# Patient Record
Sex: FEMALE | Race: WHITE | URBAN - METROPOLITAN AREA
[De-identification: names, ages, dates, MRNs, and addresses within clinical notes are randomized per-mention and may not be internally consistent; named-entity substitution may affect disease eponyms.]

---

## 2017-01-16 ENCOUNTER — TRANSFERRED RECORDS (OUTPATIENT)
Dept: HEALTH INFORMATION MANAGEMENT | Facility: CLINIC | Age: 67
End: 2017-01-16

## 2017-05-27 ENCOUNTER — HEALTH MAINTENANCE LETTER (OUTPATIENT)
Age: 67
End: 2017-05-27

## 2017-06-29 ENCOUNTER — TRANSFERRED RECORDS (OUTPATIENT)
Dept: HEALTH INFORMATION MANAGEMENT | Facility: CLINIC | Age: 67
End: 2017-06-29

## 2017-07-06 RX ORDER — CLINDAMYCIN PHOSPHATE 900 MG/50ML
900 INJECTION, SOLUTION INTRAVENOUS
Status: CANCELLED | OUTPATIENT
Start: 2017-07-06

## 2017-07-06 RX ORDER — CLINDAMYCIN PHOSPHATE 900 MG/50ML
900 INJECTION, SOLUTION INTRAVENOUS SEE ADMIN INSTRUCTIONS
Status: CANCELLED | OUTPATIENT
Start: 2017-07-06

## 2017-07-06 RX ORDER — ATOVAQUONE AND PROGUANIL HYDROCHLORIDE 250; 100 MG/1; MG/1
1 TABLET, FILM COATED ORAL DAILY
Status: ON HOLD | COMMUNITY
End: 2017-07-07

## 2017-07-07 ENCOUNTER — HOSPITAL ENCOUNTER (OUTPATIENT)
Facility: CLINIC | Age: 67
Discharge: HOME | End: 2017-07-07
Attending: ORTHOPAEDIC SURGERY | Admitting: ORTHOPAEDIC SURGERY
Payer: COMMERCIAL

## 2017-07-07 ENCOUNTER — ANESTHESIA EVENT (OUTPATIENT)
Dept: SURGERY | Facility: CLINIC | Age: 67
End: 2017-07-07
Payer: COMMERCIAL

## 2017-07-07 ENCOUNTER — SURGERY (OUTPATIENT)
Age: 67
End: 2017-07-07

## 2017-07-07 ENCOUNTER — ANESTHESIA (OUTPATIENT)
Dept: SURGERY | Facility: CLINIC | Age: 67
End: 2017-07-07
Payer: COMMERCIAL

## 2017-07-07 VITALS
BODY MASS INDEX: 24.01 KG/M2 | OXYGEN SATURATION: 96 % | SYSTOLIC BLOOD PRESSURE: 84 MMHG | WEIGHT: 144.1 LBS | HEIGHT: 65 IN | RESPIRATION RATE: 16 BRPM | TEMPERATURE: 97.3 F | DIASTOLIC BLOOD PRESSURE: 49 MMHG

## 2017-07-07 DIAGNOSIS — M05.732 RHEUMATOID ARTHRITIS INVOLVING LEFT WRIST WITH POSITIVE RHEUMATOID FACTOR (H): Primary | ICD-10-CM

## 2017-07-07 PROCEDURE — 36000056 ZZH SURGERY LEVEL 3 1ST 30 MIN: Performed by: ORTHOPAEDIC SURGERY

## 2017-07-07 PROCEDURE — 40000170 ZZH STATISTIC PRE-PROCEDURE ASSESSMENT II: Performed by: ORTHOPAEDIC SURGERY

## 2017-07-07 PROCEDURE — 36000058 ZZH SURGERY LEVEL 3 EA 15 ADDTL MIN: Performed by: ORTHOPAEDIC SURGERY

## 2017-07-07 PROCEDURE — 25000125 ZZHC RX 250: Performed by: ORTHOPAEDIC SURGERY

## 2017-07-07 PROCEDURE — 25000125 ZZHC RX 250: Performed by: PHYSICIAN ASSISTANT

## 2017-07-07 PROCEDURE — 37000008 ZZH ANESTHESIA TECHNICAL FEE, 1ST 30 MIN: Performed by: ORTHOPAEDIC SURGERY

## 2017-07-07 PROCEDURE — 25000132 ZZH RX MED GY IP 250 OP 250 PS 637: Performed by: ANESTHESIOLOGY

## 2017-07-07 PROCEDURE — 71000012 ZZH RECOVERY PHASE 1 LEVEL 1 FIRST HR: Performed by: ORTHOPAEDIC SURGERY

## 2017-07-07 PROCEDURE — 27210995 ZZH RX 272: Performed by: NURSE ANESTHETIST, CERTIFIED REGISTERED

## 2017-07-07 PROCEDURE — 25000125 ZZHC RX 250: Performed by: NURSE ANESTHETIST, CERTIFIED REGISTERED

## 2017-07-07 PROCEDURE — S0077 INJECTION, CLINDAMYCIN PHOSP: HCPCS | Performed by: PHYSICIAN ASSISTANT

## 2017-07-07 PROCEDURE — 25000128 H RX IP 250 OP 636: Performed by: NURSE ANESTHETIST, CERTIFIED REGISTERED

## 2017-07-07 PROCEDURE — 25000128 H RX IP 250 OP 636: Performed by: ANESTHESIOLOGY

## 2017-07-07 PROCEDURE — 71000027 ZZH RECOVERY PHASE 2 EACH 15 MINS: Performed by: ORTHOPAEDIC SURGERY

## 2017-07-07 PROCEDURE — 88304 TISSUE EXAM BY PATHOLOGIST: CPT | Mod: 26 | Performed by: ORTHOPAEDIC SURGERY

## 2017-07-07 PROCEDURE — 37000009 ZZH ANESTHESIA TECHNICAL FEE, EACH ADDTL 15 MIN: Performed by: ORTHOPAEDIC SURGERY

## 2017-07-07 PROCEDURE — 27210794 ZZH OR GENERAL SUPPLY STERILE: Performed by: ORTHOPAEDIC SURGERY

## 2017-07-07 PROCEDURE — 88304 TISSUE EXAM BY PATHOLOGIST: CPT | Performed by: ORTHOPAEDIC SURGERY

## 2017-07-07 RX ORDER — BUPIVACAINE HYDROCHLORIDE AND EPINEPHRINE 5; 5 MG/ML; UG/ML
INJECTION, SOLUTION PERINEURAL PRN
Status: DISCONTINUED | OUTPATIENT
Start: 2017-07-07 | End: 2017-07-07 | Stop reason: HOSPADM

## 2017-07-07 RX ORDER — SODIUM CHLORIDE, SODIUM LACTATE, POTASSIUM CHLORIDE, CALCIUM CHLORIDE 600; 310; 30; 20 MG/100ML; MG/100ML; MG/100ML; MG/100ML
INJECTION, SOLUTION INTRAVENOUS CONTINUOUS PRN
Status: DISCONTINUED | OUTPATIENT
Start: 2017-07-07 | End: 2017-07-07

## 2017-07-07 RX ORDER — EPHEDRINE SULFATE 50 MG/ML
INJECTION, SOLUTION INTRAMUSCULAR; INTRAVENOUS; SUBCUTANEOUS PRN
Status: DISCONTINUED | OUTPATIENT
Start: 2017-07-07 | End: 2017-07-07

## 2017-07-07 RX ORDER — PROPOFOL 10 MG/ML
INJECTION, EMULSION INTRAVENOUS CONTINUOUS PRN
Status: DISCONTINUED | OUTPATIENT
Start: 2017-07-07 | End: 2017-07-07

## 2017-07-07 RX ORDER — ONDANSETRON 2 MG/ML
4 INJECTION INTRAMUSCULAR; INTRAVENOUS EVERY 30 MIN PRN
Status: DISCONTINUED | OUTPATIENT
Start: 2017-07-07 | End: 2017-07-07 | Stop reason: HOSPADM

## 2017-07-07 RX ORDER — GINSENG 100 MG
CAPSULE ORAL PRN
Status: DISCONTINUED | OUTPATIENT
Start: 2017-07-07 | End: 2017-07-07 | Stop reason: HOSPADM

## 2017-07-07 RX ORDER — NALOXONE HYDROCHLORIDE 0.4 MG/ML
.1-.4 INJECTION, SOLUTION INTRAMUSCULAR; INTRAVENOUS; SUBCUTANEOUS
Status: DISCONTINUED | OUTPATIENT
Start: 2017-07-07 | End: 2017-07-07 | Stop reason: HOSPADM

## 2017-07-07 RX ORDER — ONDANSETRON 4 MG/1
4 TABLET, ORALLY DISINTEGRATING ORAL EVERY 30 MIN PRN
Status: DISCONTINUED | OUTPATIENT
Start: 2017-07-07 | End: 2017-07-07 | Stop reason: HOSPADM

## 2017-07-07 RX ORDER — CLINDAMYCIN PHOSPHATE 900 MG/50ML
900 INJECTION, SOLUTION INTRAVENOUS
Status: COMPLETED | OUTPATIENT
Start: 2017-07-07 | End: 2017-07-07

## 2017-07-07 RX ORDER — FENTANYL CITRATE 50 UG/ML
25-50 INJECTION, SOLUTION INTRAMUSCULAR; INTRAVENOUS EVERY 5 MIN PRN
Status: DISCONTINUED | OUTPATIENT
Start: 2017-07-07 | End: 2017-07-07 | Stop reason: HOSPADM

## 2017-07-07 RX ORDER — CLINDAMYCIN PHOSPHATE 900 MG/50ML
900 INJECTION, SOLUTION INTRAVENOUS SEE ADMIN INSTRUCTIONS
Status: DISCONTINUED | OUTPATIENT
Start: 2017-07-07 | End: 2017-07-07 | Stop reason: HOSPADM

## 2017-07-07 RX ORDER — OXYCODONE AND ACETAMINOPHEN 5; 325 MG/1; MG/1
1-2 TABLET ORAL EVERY 4 HOURS PRN
Qty: 46 TABLET | Refills: 0 | Status: SHIPPED | OUTPATIENT
Start: 2017-07-07

## 2017-07-07 RX ORDER — LIDOCAINE HYDROCHLORIDE 20 MG/ML
INJECTION, SOLUTION INFILTRATION; PERINEURAL PRN
Status: DISCONTINUED | OUTPATIENT
Start: 2017-07-07 | End: 2017-07-07

## 2017-07-07 RX ORDER — HYDROXYZINE HYDROCHLORIDE 25 MG/1
TABLET, FILM COATED ORAL
Qty: 24 TABLET | Refills: 1 | Status: SHIPPED | OUTPATIENT
Start: 2017-07-07

## 2017-07-07 RX ORDER — SACCHAROMYCES BOULARDII 250 MG
250 CAPSULE ORAL DAILY
COMMUNITY

## 2017-07-07 RX ORDER — SODIUM CHLORIDE, SODIUM LACTATE, POTASSIUM CHLORIDE, CALCIUM CHLORIDE 600; 310; 30; 20 MG/100ML; MG/100ML; MG/100ML; MG/100ML
INJECTION, SOLUTION INTRAVENOUS CONTINUOUS
Status: DISCONTINUED | OUTPATIENT
Start: 2017-07-07 | End: 2017-07-07 | Stop reason: HOSPADM

## 2017-07-07 RX ORDER — MEPERIDINE HYDROCHLORIDE 25 MG/ML
12.5 INJECTION INTRAMUSCULAR; INTRAVENOUS; SUBCUTANEOUS
Status: DISCONTINUED | OUTPATIENT
Start: 2017-07-07 | End: 2017-07-07 | Stop reason: HOSPADM

## 2017-07-07 RX ORDER — LIDOCAINE HYDROCHLORIDE 5 MG/ML
INJECTION, SOLUTION INFILTRATION; PERINEURAL PRN
Status: DISCONTINUED | OUTPATIENT
Start: 2017-07-07 | End: 2017-07-07

## 2017-07-07 RX ORDER — ONDANSETRON 2 MG/ML
INJECTION INTRAMUSCULAR; INTRAVENOUS PRN
Status: DISCONTINUED | OUTPATIENT
Start: 2017-07-07 | End: 2017-07-07

## 2017-07-07 RX ORDER — OXYCODONE AND ACETAMINOPHEN 5; 325 MG/1; MG/1
1 TABLET ORAL ONCE
Status: COMPLETED | OUTPATIENT
Start: 2017-07-07 | End: 2017-07-07

## 2017-07-07 RX ORDER — DEXAMETHASONE SODIUM PHOSPHATE 4 MG/ML
INJECTION, SOLUTION INTRA-ARTICULAR; INTRALESIONAL; INTRAMUSCULAR; INTRAVENOUS; SOFT TISSUE PRN
Status: DISCONTINUED | OUTPATIENT
Start: 2017-07-07 | End: 2017-07-07

## 2017-07-07 RX ORDER — FENTANYL CITRATE 50 UG/ML
INJECTION, SOLUTION INTRAMUSCULAR; INTRAVENOUS PRN
Status: DISCONTINUED | OUTPATIENT
Start: 2017-07-07 | End: 2017-07-07

## 2017-07-07 RX ORDER — FENTANYL CITRATE 50 UG/ML
25-50 INJECTION, SOLUTION INTRAMUSCULAR; INTRAVENOUS
Status: DISCONTINUED | OUTPATIENT
Start: 2017-07-07 | End: 2017-07-07 | Stop reason: HOSPADM

## 2017-07-07 RX ADMIN — SODIUM CHLORIDE, POTASSIUM CHLORIDE, SODIUM LACTATE AND CALCIUM CHLORIDE: 600; 310; 30; 20 INJECTION, SOLUTION INTRAVENOUS at 09:20

## 2017-07-07 RX ADMIN — LIDOCAINE HYDROCHLORIDE 50 ML: 5 INJECTION, SOLUTION INFILTRATION; PERINEURAL at 09:26

## 2017-07-07 RX ADMIN — ONDANSETRON 4 MG: 2 INJECTION INTRAMUSCULAR; INTRAVENOUS at 09:35

## 2017-07-07 RX ADMIN — BUPIVACAINE HYDROCHLORIDE AND EPINEPHRINE BITARTRATE 7 ML: 5; .005 INJECTION, SOLUTION PERINEURAL at 10:23

## 2017-07-07 RX ADMIN — FENTANYL CITRATE 50 MCG: 50 INJECTION, SOLUTION INTRAMUSCULAR; INTRAVENOUS at 09:21

## 2017-07-07 RX ADMIN — MIDAZOLAM HYDROCHLORIDE 2 MG: 1 INJECTION, SOLUTION INTRAMUSCULAR; INTRAVENOUS at 09:21

## 2017-07-07 RX ADMIN — Medication 5 MG: at 10:26

## 2017-07-07 RX ADMIN — OXYCODONE HYDROCHLORIDE AND ACETAMINOPHEN 1 TABLET: 5; 325 TABLET ORAL at 11:08

## 2017-07-07 RX ADMIN — DEXAMETHASONE SODIUM PHOSPHATE 4 MG: 4 INJECTION, SOLUTION INTRA-ARTICULAR; INTRALESIONAL; INTRAMUSCULAR; INTRAVENOUS; SOFT TISSUE at 09:24

## 2017-07-07 RX ADMIN — DEXMEDETOMIDINE HYDROCHLORIDE 8 MCG: 100 INJECTION, SOLUTION INTRAVENOUS at 09:24

## 2017-07-07 RX ADMIN — FENTANYL CITRATE 50 MCG: 50 INJECTION, SOLUTION INTRAMUSCULAR; INTRAVENOUS at 10:45

## 2017-07-07 RX ADMIN — LIDOCAINE HYDROCHLORIDE 40 MG: 20 INJECTION, SOLUTION INFILTRATION; PERINEURAL at 09:30

## 2017-07-07 RX ADMIN — PROPOFOL 30 MCG/KG/MIN: 10 INJECTION, EMULSION INTRAVENOUS at 09:31

## 2017-07-07 RX ADMIN — BACITRACIN 1 G: 500 OINTMENT TOPICAL at 10:26

## 2017-07-07 RX ADMIN — CLINDAMYCIN PHOSPHATE 900 MG: 18 INJECTION, SOLUTION INTRAVENOUS at 09:22

## 2017-07-07 RX ADMIN — Medication 5 MG: at 10:20

## 2017-07-07 NOTE — ANESTHESIA PREPROCEDURE EVALUATION
Anesthesia Evaluation     . Pt has had prior anesthetic. Type: General    No history of anesthetic complications          ROS/MED HX    ENT/Pulmonary:       Neurologic:       Cardiovascular:     (+) Dyslipidemia, ----. : . . . :. .       METS/Exercise Tolerance:     Hematologic:         Musculoskeletal:   (+) arthritis (Rheumatoid), , , -       GI/Hepatic:     (+) GERD Asymptomatic on medication,       Renal/Genitourinary:         Endo:         Psychiatric:     (+) psychiatric history depression and anxiety      Infectious Disease:         Malignancy:         Other:                     Physical Exam  Normal systems: cardiovascular, pulmonary and dental    Airway   Mallampati: I  TM distance: >3 FB  Neck ROM: full    Dental     Cardiovascular   Rhythm and rate: regular and normal      Pulmonary    breath sounds clear to auscultation                    Anesthesia Plan      History & Physical Review  History and physical reviewed and following examination; no interval change.    ASA Status:  2 .    NPO Status:  > 8 hours    Plan for IV Regional Anesthesia   PONV prophylaxis:  Ondansetron (or other 5HT-3) and Dexamethasone or Solumedrol  IV REGIONAL BLOCK WITH SEDATION      Postoperative Care  Postoperative pain management:  IV analgesics and Oral pain medications.      Consents  Anesthetic plan, risks, benefits and alternatives discussed with:  Patient..                          .  DPreop diagnosis: SYNOVITIS   Procedure(s):  TENODESIS HAND AT DISTAL INTERPHALANGEAL JOINT  Allergies   Allergen Reactions     Augmentin      diarrhea     Bactrim [Sulfamethoxazole W-Trimethoprim] Hives       No current facility-administered medications on file prior to encounter.   Current Outpatient Prescriptions on File Prior to Encounter:  levothyroxine (SYNTHROID,LEVOTHROID) 100 MCG tablet Take 1 tablet (100 mcg) by mouth daily Monday through Sat, take 1/2 tab Sunday   citalopram (CELEXA) 20 MG tablet Take 1 tablet (20 mg) by mouth  daily   pravastatin (PRAVACHOL) 40 MG tablet Take 1 tablet (40 mg) by mouth every evening   predniSONE (DELTASONE) 1 MG tablet Take 2 mg by mouth daily.   VITAMIN D 1000 UNIT OR CAPS 1 CAPSULE DAILY   CALCIUM 600 + D 600-200 MG-UNIT OR TABS one twice daily   etanercept (ENBREL) 25 MG injection Inject 50 mg Subcutaneous every 21 days   methotrexate sodium 2.5 MG TABS Take 5 tablets by mouth once a week Wednesdays

## 2017-07-07 NOTE — DISCHARGE INSTRUCTIONS
Same Day Surgery Discharge Instructions for  Sedation and General Anesthesia       It's not unusual to feel dizzy, light-headed or faint for up to 24 hours after surgery or while taking pain medication.  If you have these symptoms: sit for a few minutes before standing and have someone assist you when you get up to walk or use the bathroom.      You should rest and relax for the next 24 hours. We recommend you make arrangements to have an adult stay with you for at least 24 hours after your discharge.  Avoid hazardous and strenuous activity.      DO NOT DRIVE any vehicle or operate mechanical equipment for 24 hours following the end of your surgery.  Even though you may feel normal, your reactions may be affected by the medication you have received.      Do not drink alcoholic beverages for 24 hours following surgery.       Slowly progress to your regular diet as you feel able. It's not unusual to feel nauseated and/or vomit after receiving anesthesia.  If you develop these symptoms, drink clear liquids (apple juice, ginger ale, broth, 7-up, etc. ) until you feel better.  If your nausea and vomiting persists for 24 hours, please notify your surgeon.        All narcotic pain medications, along with inactivity and anesthesia, can cause constipation. Drinking plenty of liquids and increasing fiber intake will help.      For any questions of a medical nature, call your surgeon.      Do not make important decisions for 24 hours.      If you had general anesthesia, you may have a sore throat for a couple of days related to the breathing tube used during surgery.  You may use Cepacol lozenges to help with this discomfort.  If it worsens or if you develop a fever, contact your surgeon.       If you feel your pain is not well managed with the pain medications prescribed by your surgeon, please contact your surgeon's office to let them know so they can address your concerns.             **If you have questions or concerns  about your procedure,  call Dr. Deras at [phone]**

## 2017-07-07 NOTE — ANESTHESIA CARE TRANSFER NOTE
Patient: Tammy Caceres    Procedure(s):  LEFT SMALL METACARPAL PHANLANGEAL JOINT SYNOVECTOMY, LEFT SIXTH DORSAL COMPARTMENT SYNOVECTOMY AND RECONSTRUCTION  - Wound Class: I-Clean    Diagnosis: SYNOVITIS   Diagnosis Additional Information: No value filed.    Anesthesia Type:   IV Regional Anesthesia     Note:  Airway :Room Air  Patient transferred to:PACU  Comments: Pt awake and able to verbalize needs. Spontaneous respiration without difficulty. All monitors on and audible. Report given to PACU RN, Vital Signs Stable. Pt denies pain.      Vitals: (Last set prior to Anesthesia Care Transfer)    CRNA VITALS  7/7/2017 1004 - 7/7/2017 1040      7/7/2017             Resp Rate (set): 10                Electronically Signed By: NADIYA Brothers CRNA  July 7, 2017  10:40 AM

## 2017-07-07 NOTE — BRIEF OP NOTE
[facility] Brief Operative Note    Pre-operative diagnosis: SYNOVITIS    Post-operative diagnosis same   Procedure: Procedure(s):  LEFT SMALL METACARPAL PHANLANGEAL JOINT SYNOVECTOMY, LEFT SIXTH DORSAL COMPARTMENT SYNOVECTOMY AND RECONSTRUCTION  - Wound Class: I-Clean   Surgeon(s): Surgeon(s) and Role:     * Heather Deras MD - Primary   Estimated blood loss: * No values recorded between 7/7/2017  9:36 AM and 7/7/2017 10:36 AM *    Specimens:   ID Type Source Tests Collected by Time Destination   A : Rheumatoid Synovitis Left Hand Tissue Hand, Left SURGICAL PATHOLOGY EXAM Heather Deras MD 7/7/2017  9:48 AM       Findings: As above

## 2017-07-07 NOTE — IP AVS SNAPSHOT
MRN:[id number]                      After Visit Summary   7/7/2017    Tammy Caceres    MRN: [id number]           Thank you!     Thank you for choosing [facility] for your care. Our goal is always to provide you with excellent care. Hearing back from our patients is one way we can continue to improve our services. Please take a few minutes to complete the written survey that you may receive in the mail after you visit with us. Thank you!        Patient Information     Date Of Birth          1950        About your hospital stay     You were admitted on:  July 7, 2017 You last received care in the:  [facility] Same Day Surgery    You were discharged on:  July 7, 2017       Who to Call     For medical emergencies, please call 911.  For non-urgent questions about your medical care, please call your primary care provider or clinic, [phone]  For questions related to your surgery, please call your surgery clinic        Attending Provider     Provider Heather Garcia MD Orthopedics       Primary Care Provider Office Phone # Fax #    Sraah Marks [phone]      After Care Instructions     Discharge Instructions       Post-Operative Instructions  [facility]  Nadia Deras M.D.    PAIN    You may have numbing medication at your surgical site. As this medicine wears off, you can take the pain pills prescribed for you. Keep your arm elevated above your heart for the first 24 hours following surgery to reduce swelling and pain.    BANDAGE    Leave your bandage on and keep it clean and dry until you are seen in the clinic for a follow-up visit. If you take a shower, cover it with plastic wrap or a plastic bag.    SLING    If you receive a sling for your arm before you leave the hospital, you can wear it until your nerve block has lost its effect. After that, you should avoid using the sling, otherwise, you can develop neck and shoulder  stiffness and discomfort.    ACTIVITIES    After surgery, begin moving your fingers gently. You may use your hand for light activities and driving while wearing the bandage. You may also return to work for light duty. Do not do any heavy lifting, pushing, or pulling with your hand.     FOLLOW-UP    You will need to come back for a checkup 10 to 14 days after your surgery. Call [phone] as soon as possible to make your appointment to see CHANDRIKA Quesada. You can be seen at [facility] ([facility] [address] [place] [address], 2nd floor, [place]) on Tuesday or Thursday. Tabitha Clark, my physician assistant, will examine your incision and take out your stitches. After that, you will need to wear the removable splint you will receive in the clinic. Your next appointment will be 4 weeks later with Dr. Deras.    Call Tabitha Clark at [phone] between 8:30 a.m. and 5:00 p.m. if you have:   A fever (101 F or higher)  Redness, swelling, or draining at the surgical site  Nausea, vomiting, or a rash from your medicine(s)  Any questions, problems, or concerns    For emergencies after 5:00 p.m., call the on-call physician at [phone].Review outpatient procedure discharge instructions with patient as directed by Provider                  Further instructions from your care team       Same Day Surgery Discharge Instructions for  Sedation and General Anesthesia       It's not unusual to feel dizzy, light-headed or faint for up to 24 hours after surgery or while taking pain medication.  If you have these symptoms: sit for a few minutes before standing and have someone assist you when you get up to walk or use the bathroom.      You should rest and relax for the next 24 hours. We recommend you make arrangements to have an adult stay with you for at least 24 hours after your discharge.  Avoid hazardous and strenuous activity.      DO NOT DRIVE any vehicle or operate mechanical equipment for 24 hours following the end of your  "surgery.  Even though you may feel normal, your reactions may be affected by the medication you have received.      Do not drink alcoholic beverages for 24 hours following surgery.       Slowly progress to your regular diet as you feel able. It's not unusual to feel nauseated and/or vomit after receiving anesthesia.  If you develop these symptoms, drink clear liquids (apple juice, ginger ale, broth, 7-up, etc. ) until you feel better.  If your nausea and vomiting persists for 24 hours, please notify your surgeon.        All narcotic pain medications, along with inactivity and anesthesia, can cause constipation. Drinking plenty of liquids and increasing fiber intake will help.      For any questions of a medical nature, call your surgeon.      Do not make important decisions for 24 hours.      If you had general anesthesia, you may have a sore throat for a couple of days related to the breathing tube used during surgery.  You may use Cepacol lozenges to help with this discomfort.  If it worsens or if you develop a fever, contact your surgeon.       If you feel your pain is not well managed with the pain medications prescribed by your surgeon, please contact your surgeon's office to let them know so they can address your concerns.             **If you have questions or concerns about your procedure,  call Dr. Deras at [phone]**          Pending Results     No orders found from 7/5/2017 to 7/8/2017.            Admission Information     Date & Time Provider Department Dept. Phone    7/7/2017 Heather Deras MD [facility] Same Day Surgery [phone]      Your Vitals Were     Blood Pressure Temperature Respirations Height Weight Last Period    95/54 97.5  F (36.4  C) 13 1.651 m (5' 5\") 65.4 kg (144 lb 1.6 oz) 05/01/2003    Pulse Oximetry BMI (Body Mass Index)                97% 23.98 kg/m2          [employer] Information     [employer] gives you secure access to your electronic health record. If " you see a primary care provider, you can also send messages to your care team and make appointments. If you have questions, please call your primary care clinic.  If you do not have a primary care provider, please call [phone] and they will assist you.        Care EveryWhere ID     This is your Care EveryWhere ID. This could be used by other organizations to access your [place] medical records  [id number]        Equal Access to Services     MARIA ESTHER WU : Hadii mary durano Soninaali, waaxda luqadaha, qaybta kaalmada adepoonamsusyduyen, ariel titusjuliotoney nassar. So [facility] [phone].    ATENCIÓN: Si corine kandi, tiene a gonzalez disposición servicios gratuitos de asistencia lingüística. Llame al [phone].    We comply with applicable federal civil rights laws and [place] laws. We do not discriminate on the basis of race, color, national origin, age, disability sex, sexual orientation or gender identity.               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        Dose:  1-2 tablet   Take 1-2 tablets by mouth every 4 hours as needed for pain (moderate to severe)   Quantity:  46 tablet   Refills:  0         CONTINUE these medicines which have NOT CHANGED        Dose / Directions    CALCIUM 600 + D 600-200 MG-UNIT Tabs        one twice daily   Refills:  0       citalopram 20 MG tablet   Commonly known as:  celeXA   Used for:  Anxiety state, unspecified         Dose:  20 mg   Take 1 tablet (20 mg) by mouth daily   Quantity:  90 tablet   Refills:  3       ENBREL 25 MG vial injection kit   Used for:  Rheumatoid arthritis(714.0)   Generic drug:  etanercept        Dose:  50 mg   Inject 50 mg Subcutaneous every 21 days   Quantity:  1 each   Refills:  0       levothyroxine 100 MCG tablet   Commonly known as:  SYNTHROID/LEVOTHROID   Used for:  Hypothyroidism        Dose:  100 mcg   Take 1 tablet (100 mcg) by mouth daily Monday through Sat, take 1/2 tab Sunday   Quantity:  90 tablet   Refills:  3       methotrexate sodium 2.5 MG Tabs   Used for:  Rheumatoid arthritis(714.0)        Dose:  5 tablet   Take 5 tablets by mouth once a week Wednesdays   Refills:  0       pravastatin 40 MG tablet   Commonly known as:  PRAVACHOL   Used for:  Hyperlipidemia LDL goal <130        Dose:  40 mg   Take 1 tablet (40 mg) by mouth every evening   Quantity:  90 tablet   Refills:  3       predniSONE 1 MG tablet   Commonly known as:  DELTASONE        Dose:  2 mg   Take 2 mg by mouth daily.   Refills:  0       saccharomyces boulardii 250 MG capsule   Commonly known as:  FLORASTOR        Dose:  250 mg   Take 250 mg by mouth daily   Refills:  0       vitamin D 1000 UNITS capsule        1 CAPSULE DAILY   Quantity:  0   Refills:  0            Where to get your medicines      These medications were sent to [facility] - [address]  [address], [place] [address]     Phone:  [phone]     hydrOXYzine 25 MG tablet         Some of these will need a paper prescription and others can be bought over the counter. Ask your nurse if you have questions.     Bring a paper prescription for each of these medications     oxyCODONE-acetaminophen 5-325 MG per tablet                Protect others around you: Learn how to safely use, store and throw away your medicines at www.disposemymeds.org.             Medication List: This is a list of all your medications and when  to take them. Check marks below indicate your daily home schedule. Keep this list as a reference.      Medications           Morning Afternoon Evening Bedtime As Needed    CALCIUM 600 + D 600-200 MG-UNIT Tabs   one twice daily                                citalopram 20 MG tablet   Commonly known as:  celeXA   Take 1 tablet (20 mg) by mouth daily                                ENBREL 25 MG vial injection kit   Inject 50 mg Subcutaneous every 21 days   Generic drug:  etanercept                                hydrOXYzine 25 MG tablet   Commonly known as:  ATARAX   Take 1 25-mg tablet by mouth every 6 hours for muscle relaxation and to supplement your pain medication.                                levothyroxine 100 MCG tablet   Commonly known as:  SYNTHROID/LEVOTHROID   Take 1 tablet (100 mcg) by mouth daily Monday through Sat, take 1/2 tab Sunday                                methotrexate sodium 2.5 MG Tabs   Take 5 tablets by mouth once a week Wednesdays                                oxyCODONE-acetaminophen 5-325 MG per tablet   Commonly known as:  PERCOCET   Take 1-2 tablets by mouth every 4 hours as needed for pain (moderate to severe)   Last time this was given:  1 tablet on 7/7/2017 11:08 AM   Notes to Patient:  You had 1 Percocet at 11:00 am today.                                pravastatin 40 MG tablet   Commonly known as:  PRAVACHOL   Take 1 tablet (40 mg) by mouth every evening                                predniSONE 1 MG tablet   Commonly known as:  DELTASONE   Take 2 mg by mouth daily.                                saccharomyces boulardii 250 MG capsule   Commonly known as:  FLORASTOR   Take 250 mg by mouth daily                                vitamin D 1000 UNITS capsule   1 CAPSULE DAILY

## 2017-07-07 NOTE — IP AVS SNAPSHOT
[facility] Same Day Surgery    [address]    [place] [address]    Phone:  [phone]    Fax:  [phone]                                       After Visit Summary   7/7/2017    Tammy Caceres    MRN: [id number]           After Visit Summary Signature Page     I have received my discharge instructions, and my questions have been answered. I have discussed any challenges I see with this plan with the nurse or doctor.    ..........................................................................................................................................  Patient/Patient Representative Signature      ..........................................................................................................................................  Patient Representative Print Name and Relationship to Patient    ..................................................               ................................................  Date                                            Time    ..........................................................................................................................................  Reviewed by Signature/Title    ...................................................              ..............................................  Date                                                            Time

## 2017-07-07 NOTE — OR NURSING
"Blood pressure 84/49, pulse 67. Patient feels \"groggy,\" denies dizziness or feeling light-headed. States BP 80/50 is not unusual for her. Notified Dr. Mcgee. OK to discharge patient home.  "

## 2017-07-07 NOTE — ANESTHESIA POSTPROCEDURE EVALUATION
Patient: Tammy Caceres    Procedure(s):  LEFT SMALL METACARPAL PHANLANGEAL JOINT SYNOVECTOMY, LEFT SIXTH DORSAL COMPARTMENT SYNOVECTOMY AND RECONSTRUCTION  - Wound Class: I-Clean    Diagnosis:SYNOVITIS   Diagnosis Additional Information: No value filed.    Anesthesia Type:  IV Regional Anesthesia    Note:  Anesthesia Post Evaluation    Patient location during evaluation: PACU  Patient participation: Able to fully participate in evaluation  Level of consciousness: awake  Pain management: adequate  Airway patency: patent  Cardiovascular status: acceptable  Respiratory status: acceptable  Hydration status: acceptable  PONV: none     Anesthetic complications: None          Last vitals:  Vitals:    07/07/17 1100 07/07/17 1115 07/07/17 1155   BP: 95/54 94/60 (!) 84/49   Resp: 13 20 16   Temp: 36.4  C (97.5  F) 36.3  C (97.3  F)    SpO2: 97% 98% 96%         Electronically Signed By: Jerald Mcgee MD  July 7, 2017  12:55 PM

## 2017-07-10 LAB — COPATH REPORT: NORMAL

## 2017-07-11 NOTE — OP NOTE
Surgeon / Clinician: Heather Deras MD    DATE OF SURGERY: 7/7/2017    Preoperative Diagnosis:  Left small metatarsophalangeal joint rheumatoid synovitis and left extensor carpi ulnaris sheath tenosynovitis.    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    Procedure:  Left small finger MP joint synovectomy on the left.  ECU sheath tenosynovectomy and ECU sheath reconstruction with the dorsal retinaculum.    Surgeon:  Heather Deras.    Assistant:  None.    Anesthesia:  Lithonia block.    Tourniquet Time:  40 minutes.    Surgical consent was signed me me, confirmed at the time out.  Informed consent was obtained in the holding area and confirmed prior to entrance to the operating room.  Prophylactic antibiotics were given prior to tourniquet inflation.  DVT risk assessment was as per same day surgery protocol.    Procedure:  She was brought to the operating room, given a Lithonia Block anesthetic, her hand was then prepped and draped in the normal standard manner.  A time out was performed and the tourniquet was 150 mm above her systolic pressure.  At the small finger, a curvilinear incision was made at the MP joint radial to the extensor mechanism.  It was carried down through the subcutaneous tissue.  The sagittal band was then divided and the patient had a yellow corn meal like mixture coming from a large articular cyst.  It appeared that it had a wall to it, a synovial wall, so a portion of the capsule was debrided along with this pannus type of infiltrate.  This tissue was sent to pathology for identification.  The joint was irrigated out copiously.  The articular cartilage was still pristine at the MP joint.  The capsular tissue was then closed with 3-0 Vicryl.  The retinacular remnant was tightened up because it was so stretched out; that was closed with 3-0 Vicryl and then the skin was closed with  4-0 nylon.  Then attention was turned to the wrist.  A V-type incision was made apex radial, centered over the distal radial ulnar joint with long slender limbs carried distally and proximally.  The skin was elevated up and retracted with a 4-0 nylon stitch.  The superficial branch of the ulnar nerve was identified and retracted with a vessel loop.  Next, an incision was made distally from the retinaculum so there was a good 1.5 cm of the retinaculum that was sturdy.  I made an incision distal and proximal to that.  Again, the patient had abundant tenosynovitis.  The patient also had fraying of her ECU tendon and it was split.  With retraction of the ECU and debridement with a rongeur of this synovium, you could see that it was actually coming from the ulnar corner of the wrist near the ulnar styloid.  Given the amount of distraction in the ECU sheath, I decided to do an ECU sheath reconstruction with that 1.5 cm flap of the retinaculum, so I cut that on the most volar ulnar aspect of the fixed dorsal compartment.  I finished debriding the ECU tendon of the tenosynovium.  I placed a couple 3-0 Vicryl mattress stitches through the ECU tendon once the synovitis was debrided to put the 2 edges together since there was fraying intrasubstance in the ECU.  I then took the retinacular flap and passed that underneath the ECU, brought the ECU up on top of the dorsal ulna, sewed the retinaculum to itself, basically it was rotated 180 degrees on itself and the tendon fell through nicely.  That was sewn with 3-0 Vicryl mattress stitches, keeping the ECU up on top of the wrist.  The remaining remnants of the retinaculum were then used to close the defect in the wrist joint so the volar flap of the ECU was brought up dorsally over the ulna creating a capsular flap that was absent.  Marcaine with epi was injected, the tourniquet was let down.  The skin at the wrist was closed with a 4-0 Monocryl.  The patient was placed in a wrist  splint and transferred to the Recovery Room in stable condition.        Heather Deras MD    D:  07/07/2017 17:15 T:  07/11/2017 15:19  Document:  [id number] MM\PV\CB

## 2017-07-23 NOTE — OP NOTE
Surgeon / Clinician: Heather Deras MD    PREOP DIAGNOSIS:  Synovitis, likely rheumatoid synovitis.    POSTOP DIAGNOSIS:  Synovitis, likely rheumatoid synovitis.    PROCEDURES:  Left small finger MP joint synovectomy and left sixth dorsal compartment synovectomy with reconstruction using the retinaculum.      SURGEON:  Heather Deras MD.    ASSISTANT:  None.    ANESTHESIA:  Regional.     TOURNIQUET TIME:  Under an hour.      DESCRIPTION OF PROCEDURE:  Surgical site was signed by me and confirmed at the timeout.  Informed consent was obtained in the holding area and confirmed prior to entrance to the operating room.  Prophylactic antibiotics were given prior to tourniquet inflation.  DVT risk assessment was as per surgery protocol.  She is brought to the operating room.  Her arm was prepped and draped in a normal standard manner.  The tourniquet was inflated to 150 millimeters above systolic pressure.  A time out was then performed.  At the MP joint of the small finger, a curvilinear incision was made at the MP joint and what was noted is that the patient had a large amount of tenosynovium coming from the joint and the sagittal band.  So the sagittal band was released.  The abundant tenosynovium was excised and the joint was inspected.  The patient's articular cartilage looked good with minimal articular wear.  The joint was irrigated out the capsule because it had been so stretched out from the synovitis,  then trimmed down with my tenotomy scissors and then repaired with 3-0 undyed Vicryl.  The sagittal band to having been stretched out, I excised some redundant sagittal band and repaired that with 3-0 undyed Vicryl.  Marcaine with epi was injected and then closure took place.  After the wrist procedure, the tourniquet was let down.  The bleeders were cauterized and that skin was closed with a 4-0 Monocryl.  At the wrist, a V type incision was made apex radial over the fourth dorsal  compartment, carried back proximally.  She had abundant tenosynovitis proximal through the retinaculum and even distal toward the base of the fifth metacarpal.  I tried to do the tenosynovectomy proximal and distal to the retinaculum seeing if I could save the ECU in its sheath; however she had again abundant rheumatoid tenosynovium and it appeared that it was coming from the ulnar corner just distal to the ulnar styloid of her wrist.  Because of this, because she had a niecy hole from her wrist capsular joint, I felt that leaving the ECU in that sheath, which was already frayed, I decided I should transpose the ECU.  So I completely released the sixth dorsal compartment after I had made a ___ based flap of the dorsal retinaculum from the fourth dorsal compartment all the way over to the edge of the sixth dorsal compartment.  It took about a 0.5 centimeter flap and elevated that up ___.  I then completely released the ECU subsheath, saw that the patient had fraying of her ECU, so I debrided any fraying of the tendon and then I did a side-to-side mattress stitch of the ECU to hold it altogether.  The rheumatoid tenosynovium was debrided down to the base of the small finger and about 3 centimeters proximal to the retinaculum.  Then once this was done, I debrided the ulnar corner of the wrist and then brought the ECU up on top by using the flap of retinaculum, inverted so the dorsal surface was the under sheath of the sixth dorsal compartment.  What this did was pull the ECU dorsal over the entire wrist joint.  It protected it and also applied a dorsal type pressure over the distal ulna.  The tendon glided through the sixth dorsal compartment, which was repaired to itself with 4-0 white Ethibond as well as 4-0 Vicryl.  Marcaine with epi was injected.  The tourniquet was let down.  The bleeders at both the small finger MCP joint and at this incision site were all cauterized.  The skin was then closed with a 4-0  Monocryl.  The patient was placed in a wrist splint and transferred to recovery room in stable condition.        Heather Deras MD    D:  07/19/2017 11:24 T:  07/22/2017 10:27  Document:  [id number] MM\JH

## 2019-09-28 ENCOUNTER — HEALTH MAINTENANCE LETTER (OUTPATIENT)
Age: 69
End: 2019-09-28

## 2019-10-14 ENCOUNTER — HOSPITAL PATHOLOGY (OUTPATIENT)
Dept: OTHER | Facility: CLINIC | Age: 69
End: 2019-10-14

## 2019-10-17 LAB — COPATH REPORT: NORMAL

## 2019-10-27 ENCOUNTER — HEALTH MAINTENANCE LETTER (OUTPATIENT)
Age: 69
End: 2019-10-27

## 2020-03-15 ENCOUNTER — HEALTH MAINTENANCE LETTER (OUTPATIENT)
Age: 70
End: 2020-03-15

## 2020-11-23 ENCOUNTER — TELEPHONE (OUTPATIENT)
Dept: FAMILY MEDICINE | Facility: CLINIC | Age: 70
End: 2020-11-23

## 2020-11-24 NOTE — TELEPHONE ENCOUNTER
Central Prior Authorization Team   Phone: [phone]      PA NOT [facility] PROVIDER    Medication: methotrexate sodium 2.5 MG TABS-NOT [facility] PROVIDER  Insurance Company:    Pharmacy Filling the Rx:    Filling Pharmacy Phone:    Filling Pharmacy Fax:    Start Date: 11/24/2020    When bringing up the CMM key the provider is not from  and the patient has not been since at  for 3 years.

## 2021-01-10 ENCOUNTER — HEALTH MAINTENANCE LETTER (OUTPATIENT)
Age: 71
End: 2021-01-10

## 2021-05-08 ENCOUNTER — HEALTH MAINTENANCE LETTER (OUTPATIENT)
Age: 71
End: 2021-05-08

## 2021-10-23 ENCOUNTER — HEALTH MAINTENANCE LETTER (OUTPATIENT)
Age: 71
End: 2021-10-23

## 2022-06-04 ENCOUNTER — HEALTH MAINTENANCE LETTER (OUTPATIENT)
Age: 72
End: 2022-06-04

## 2022-10-09 ENCOUNTER — HEALTH MAINTENANCE LETTER (OUTPATIENT)
Age: 72
End: 2022-10-09

## 2023-06-10 ENCOUNTER — HEALTH MAINTENANCE LETTER (OUTPATIENT)
Age: 73
End: 2023-06-10

## 2023-10-28 ENCOUNTER — HEALTH MAINTENANCE LETTER (OUTPATIENT)
Age: 73
End: 2023-10-28

## (undated) DEVICE — PACK HAND WRIST SOP15HWFSP

## (undated) DEVICE — VESSEL LOOPS YELLOW MINI 31145660

## (undated) DEVICE — SU VICRYL 3-0 RB-1 27" UND J215H

## (undated) DEVICE — SOL NACL 0.9% IRRIG 1000ML BOTTLE 07138-09

## (undated) DEVICE — GLOVE PROTEXIS POWDER FREE 7.5 ORTHOPEDIC 2D73ET75

## (undated) DEVICE — GLOVE PROTEXIS BLUE W/NEU-THERA 6.5  2D73EB65

## (undated) DEVICE — CAST PLASTER SPLINT 4X15" 7394

## (undated) DEVICE — BNDG ELASTIC 4"X5YDS UNSTERILE 6611-40

## (undated) DEVICE — DRSG KERLIX 4 1/2"X4YDS ROLL 6715

## (undated) DEVICE — SU ETHILON 4-0 PC-3 18" 1864G

## (undated) DEVICE — GLOVE PROTEXIS MICRO 6.5  2D73PM65

## (undated) DEVICE — DRSG GAUZE 4X4" 3033

## (undated) DEVICE — DRSG ADAPTIC 3X8" 6113

## (undated) DEVICE — LINEN TOWEL PACK X5 5464

## (undated) RX ORDER — DEXAMETHASONE SODIUM PHOSPHATE 4 MG/ML
INJECTION, SOLUTION INTRA-ARTICULAR; INTRALESIONAL; INTRAMUSCULAR; INTRAVENOUS; SOFT TISSUE
Status: DISPENSED
Start: 2017-07-07

## (undated) RX ORDER — ONDANSETRON 2 MG/ML
INJECTION INTRAMUSCULAR; INTRAVENOUS
Status: DISPENSED
Start: 2017-07-07

## (undated) RX ORDER — FENTANYL CITRATE 50 UG/ML
INJECTION, SOLUTION INTRAMUSCULAR; INTRAVENOUS
Status: DISPENSED
Start: 2017-07-07

## (undated) RX ORDER — LIDOCAINE HYDROCHLORIDE 20 MG/ML
INJECTION, SOLUTION EPIDURAL; INFILTRATION; INTRACAUDAL; PERINEURAL
Status: DISPENSED
Start: 2017-07-07

## (undated) RX ORDER — CLINDAMYCIN PHOSPHATE 900 MG/50ML
INJECTION, SOLUTION INTRAVENOUS
Status: DISPENSED
Start: 2017-07-07

## (undated) RX ORDER — PROPOFOL 10 MG/ML
INJECTION, EMULSION INTRAVENOUS
Status: DISPENSED
Start: 2017-07-07

## (undated) RX ORDER — OXYCODONE AND ACETAMINOPHEN 5; 325 MG/1; MG/1
TABLET ORAL
Status: DISPENSED
Start: 2017-07-07